# Patient Record
Sex: FEMALE | URBAN - METROPOLITAN AREA
[De-identification: names, ages, dates, MRNs, and addresses within clinical notes are randomized per-mention and may not be internally consistent; named-entity substitution may affect disease eponyms.]

---

## 2023-10-05 ENCOUNTER — APPOINTMENT (OUTPATIENT)
Age: 53
Setting detail: DERMATOLOGY
End: 2023-10-09

## 2023-10-05 DIAGNOSIS — L71.8 OTHER ROSACEA: ICD-10-CM

## 2023-10-05 DIAGNOSIS — L65.9 NONSCARRING HAIR LOSS, UNSPECIFIED: ICD-10-CM

## 2023-10-05 PROCEDURE — OTHER TREATMENT REGIMEN: OTHER

## 2023-10-05 PROCEDURE — OTHER FOLLOW UP FOR NEXT VISIT: OTHER

## 2023-10-05 PROCEDURE — OTHER PRESCRIPTION: OTHER

## 2023-10-05 PROCEDURE — 99203 OFFICE O/P NEW LOW 30 MIN: CPT

## 2023-10-05 PROCEDURE — OTHER COUNSELING: OTHER

## 2023-10-05 RX ORDER — IVERMECTIN/METRONIDAZOLE/NIACI 1 %-1 %-4%
THIN COAT GEL (GRAM) TOPICAL
Qty: 30 | Refills: 2 | Status: ERX | COMMUNITY
Start: 2023-10-05

## 2023-10-05 RX ORDER — DOXYCYCLINE HYCLATE 50 MG/1
50 MG CAPSULE, GELATIN COATED ORAL BID
Qty: 30 | Refills: 1 | Status: ERX | COMMUNITY
Start: 2023-10-05

## 2023-10-05 ASSESSMENT — LOCATION SIMPLE DESCRIPTION DERM
LOCATION SIMPLE: LEFT CHEEK
LOCATION SIMPLE: RIGHT CHEEK
LOCATION SIMPLE: LEFT FOREHEAD
LOCATION SIMPLE: NOSE
LOCATION SIMPLE: LEFT SCALP
LOCATION SIMPLE: ANTERIOR SCALP

## 2023-10-05 ASSESSMENT — LOCATION DETAILED DESCRIPTION DERM
LOCATION DETAILED: RIGHT LATERAL MALAR CHEEK
LOCATION DETAILED: MID-FRONTAL SCALP
LOCATION DETAILED: LEFT INFERIOR MEDIAL FOREHEAD
LOCATION DETAILED: LEFT CENTRAL MALAR CHEEK
LOCATION DETAILED: NASAL SUPRATIP
LOCATION DETAILED: LEFT LATERAL FRONTAL SCALP

## 2023-10-05 ASSESSMENT — LOCATION ZONE DERM
LOCATION ZONE: SCALP
LOCATION ZONE: NOSE
LOCATION ZONE: FACE

## 2023-10-05 ASSESSMENT — SEVERITY ASSESSMENT OVERALL AMONG ALL PATIENTS
IN YOUR EXPERIENCE, AMONG ALL PATIENTS YOU HAVE SEEN WITH THIS CONDITION, HOW SEVERE IS THIS PATIENT'S CONDITION?: MODERATE TO SEVERE

## 2023-10-05 NOTE — HPI: ACNE (PATIENT REPORTED)
Where Is Your Acne Located?: Face
Additional Comments (Use Complete Sentences): Patient presents to discuss her acne.  She also believes she has rosacea and would like to discuss treatment options for both conditions which started at the same time. Acne 8 months 16 years ago uterus taken out. She is currently not doing any treatment.

## 2023-10-05 NOTE — PROCEDURE: TREATMENT REGIMEN
Detail Level: Zone
Plan: Patient reassured this is consistent with Rosacea. She will use the topical Rosacea gel from the office once or twice daily. She will take the oral doxycycline 100 mg once daily. Follow up in 6 to 8 weeks to check progress to determine if she needs to continue any form of oral medication or if she can control this with just the topical regimen.
Plan: Patient did have previous bloodwork. She is going to get us a copy of that bloodwork to determine if any further laboratory studies might be needed regarding her alopecia. Once we have that if everything is normal, we will consider other treatment options.
Initiate Treatment: [facility] rosacea gel, doxycycline

## 2023-10-05 NOTE — PROCEDURE: FOLLOW UP FOR NEXT VISIT
Scheduled For Follow Up In (Optional): 2 months
Detail Level: Detailed
Scheduled For Follow Up In (Optional): prn

## 2023-10-05 NOTE — HPI: ANDROGENIC ALOPECIA
Is This A New Presentation, Or A Follow-Up?: Hair Loss
How Did The Hair Loss Occur?: gradual in onset
How Severe Is Your Hair Loss?: moderate
Previous Labs: Yes

## 2023-10-11 ENCOUNTER — APPOINTMENT (OUTPATIENT)
Age: 53
Setting detail: DERMATOLOGY
End: 2023-10-11

## 2023-10-11 DIAGNOSIS — L65.9 NONSCARRING HAIR LOSS, UNSPECIFIED: ICD-10-CM

## 2023-10-11 PROCEDURE — OTHER ORDER TESTS: OTHER

## 2023-10-11 PROCEDURE — OTHER TREATMENT REGIMEN: OTHER

## 2023-10-11 NOTE — PROCEDURE: TREATMENT REGIMEN
Plan: Patient had CBC and CMP checked previously so we will NOT include those on the lab order pay. The remainder of the lab order will be specific for her alopecia. Once we have those results, we will call the patient to review them and consider different treatment options based on those results.
Detail Level: Simple

## 2023-12-05 ENCOUNTER — APPOINTMENT (OUTPATIENT)
Age: 53
Setting detail: DERMATOLOGY
End: 2023-12-07

## 2023-12-05 DIAGNOSIS — L65.9 NONSCARRING HAIR LOSS, UNSPECIFIED: ICD-10-CM

## 2023-12-05 DIAGNOSIS — L71.8 OTHER ROSACEA: ICD-10-CM

## 2023-12-05 DIAGNOSIS — L81.4 OTHER MELANIN HYPERPIGMENTATION: ICD-10-CM

## 2023-12-05 PROCEDURE — OTHER PRESCRIPTION: OTHER

## 2023-12-05 PROCEDURE — OTHER COUNSELING: OTHER

## 2023-12-05 PROCEDURE — 99213 OFFICE O/P EST LOW 20 MIN: CPT

## 2023-12-05 PROCEDURE — OTHER FOLLOW UP FOR NEXT VISIT: OTHER

## 2023-12-05 PROCEDURE — OTHER TREATMENT REGIMEN: OTHER

## 2023-12-05 RX ORDER — IVERMECTIN/METRONIDAZOLE/NIACI 1 %-1 %-4%
THIN COAT GEL (GRAM) TOPICAL QHS
Qty: 30 | Refills: 0 | Status: ERX

## 2023-12-05 RX ORDER — H-QUINONE/TRETINOIN/HYDROCORT 8-0.05-0.5
THIN COAT EMULSION (GRAM) TOPICAL AS DIRECTED
Qty: 30 | Refills: 0 | Status: ERX | COMMUNITY
Start: 2023-12-05

## 2023-12-05 RX ORDER — MINOXIDIL/PROGESTERONE 7 %-0.1 %
THIN COAT SOLUTION, NON-ORAL TOPICAL DAILY
Qty: 60 | Refills: 0 | Status: ERX | COMMUNITY
Start: 2023-12-05

## 2023-12-05 ASSESSMENT — LOCATION DETAILED DESCRIPTION DERM
LOCATION DETAILED: LEFT SUPERIOR UPPER BACK
LOCATION DETAILED: MID-FRONTAL SCALP
LOCATION DETAILED: RIGHT LATERAL MALAR CHEEK
LOCATION DETAILED: NASAL SUPRATIP
LOCATION DETAILED: LEFT INFERIOR MEDIAL FOREHEAD
LOCATION DETAILED: LEFT CENTRAL MALAR CHEEK

## 2023-12-05 ASSESSMENT — LOCATION ZONE DERM
LOCATION ZONE: FACE
LOCATION ZONE: TRUNK
LOCATION ZONE: NOSE
LOCATION ZONE: SCALP

## 2023-12-05 ASSESSMENT — LOCATION SIMPLE DESCRIPTION DERM
LOCATION SIMPLE: RIGHT CHEEK
LOCATION SIMPLE: LEFT UPPER BACK
LOCATION SIMPLE: LEFT FOREHEAD
LOCATION SIMPLE: ANTERIOR SCALP
LOCATION SIMPLE: LEFT CHEEK
LOCATION SIMPLE: NOSE

## 2023-12-05 ASSESSMENT — SEVERITY ASSESSMENT OVERALL AMONG ALL PATIENTS
IN YOUR EXPERIENCE, AMONG ALL PATIENTS YOU HAVE SEEN WITH THIS CONDITION, HOW SEVERE IS THIS PATIENT'S CONDITION?: MINIMAL
IN YOUR EXPERIENCE, AMONG ALL PATIENTS YOU HAVE SEEN WITH THIS CONDITION, HOW SEVERE IS THIS PATIENT'S CONDITION?: S1 (LESS THAN 25% HAIR LOSS)

## 2023-12-05 ASSESSMENT — BSA RASH: BSA RASH: 1

## 2023-12-05 ASSESSMENT — SEVERITY OF ALOPECIA TOOL: % SCALP HAIR LOST: 10

## 2023-12-05 ASSESSMENT — SEVERITY ASSESSMENT: SEVERITY: MILD TO MODERATE

## 2023-12-05 NOTE — PROCEDURE: TREATMENT REGIMEN
Detail Level: Zone
Plan: Patient has good control. She will continue the topical in-house rosacea gel. She can start to taper off of the doxycycline to see if she still needs it. If so, she can contact me for possibly a lower dose prescription if she needs to continue taking the oral medication. Regarding the small blood vessels around the nose and on the cheeks, we did briefly discussed the possibility of IPL and I will email her information regarding what that might cost.
Plan: This appears to be consistent with likely androgenetic alopecia. However, in reviewing her bloodwork, she does have a slightly low vitamin D, which she is taking supplementation for. She also has a slightly low DHEAS, which could imply adrenal insufficiency, but she has no other symptoms. I will need to do a little reading and research to determine if anything further needs to be checked regarding that lab abnormality. She may need to see her primary care physician For further work. In regards to that we could start the patient on spironolactone which may help the hair loss. She’s also going to start the in-house topical minoxidil product to help stimulate hair growth. If still no improvement, we may consider scalp biopsy.
Plan: There is postoperative hyperpigmentation on her left upper back. Some of that shows some brown discoloration but some of that is also vascular. She will try the topical melasma emulsion to see if that helps fade some of the color, but she can also consider IPL and I will email her a quote about possible treatment and cost.
Detail Level: Detailed
Continue Regimen: [facility] rosacea gel, doxycycline

## 2023-12-06 RX ORDER — SPIRONOLACTONE 50 MG/1
50 MG TABLET, FILM COATED ORAL BID
Qty: 60 | Refills: 6 | Status: ERX | COMMUNITY
Start: 2023-12-06

## 2023-12-06 RX ORDER — DOXYCYCLINE HYCLATE 50 MG/1
50 MG CAPSULE, GELATIN COATED ORAL DAILY
Qty: 30 | Refills: 1 | Status: ERX

## 2024-03-05 ENCOUNTER — RX ONLY (RX ONLY)
Age: 54
End: 2024-03-05

## 2024-03-05 ENCOUNTER — APPOINTMENT (OUTPATIENT)
Age: 54
Setting detail: DERMATOLOGY
End: 2024-03-05

## 2024-03-05 DIAGNOSIS — L71.8 OTHER ROSACEA: ICD-10-CM

## 2024-03-05 PROCEDURE — OTHER TREATMENT REGIMEN: OTHER

## 2024-03-05 PROCEDURE — OTHER PRESCRIPTION: OTHER

## 2024-03-05 PROCEDURE — 99213 OFFICE O/P EST LOW 20 MIN: CPT

## 2024-03-05 PROCEDURE — OTHER FOLLOW UP FOR NEXT VISIT: OTHER

## 2024-03-05 PROCEDURE — OTHER COUNSELING: OTHER

## 2024-03-05 RX ORDER — H-QUINONE/TRETINOIN/HYDROCORT 8-0.05-0.5
EMULSION (GRAM) TOPICAL
Qty: 30 | Refills: 0 | Status: CANCELLED | COMMUNITY
Start: 2024-03-05

## 2024-03-05 RX ORDER — IVERMECTIN/METRONIDAZOLE/NIACI 1 %-1 %-4%
GEL (GRAM) TOPICAL
Qty: 30 | Refills: 0 | Status: ERX

## 2024-03-05 RX ORDER — IVERMECTIN/METRONIDAZOLE/NIACI 1 %-1 %-4%
GEL (GRAM) TOPICAL QHS
Qty: 60 | Refills: 0 | Status: CANCELLED

## 2024-03-05 RX ORDER — DOXYCYCLINE HYCLATE 50 MG/1
CAPSULE, GELATIN COATED ORAL DAILY
Qty: 30 | Refills: 6 | Status: ERX

## 2024-03-05 ASSESSMENT — LOCATION DETAILED DESCRIPTION DERM
LOCATION DETAILED: RIGHT LATERAL MALAR CHEEK
LOCATION DETAILED: LEFT CENTRAL MALAR CHEEK
LOCATION DETAILED: LEFT INFERIOR MEDIAL FOREHEAD
LOCATION DETAILED: NASAL SUPRATIP

## 2024-03-05 ASSESSMENT — LOCATION SIMPLE DESCRIPTION DERM
LOCATION SIMPLE: NOSE
LOCATION SIMPLE: LEFT FOREHEAD
LOCATION SIMPLE: RIGHT CHEEK
LOCATION SIMPLE: LEFT CHEEK

## 2024-03-05 ASSESSMENT — LOCATION ZONE DERM
LOCATION ZONE: FACE
LOCATION ZONE: NOSE

## 2024-03-05 NOTE — PROCEDURE: TREATMENT REGIMEN
Continue Regimen: [facility] rosacea gel, doxycycline
Detail Level: Zone
Plan: Patient is seeing good results using the topical and oral combination. She will continue the topical in-house rosacea gel as well as the low dose doxycycline. We will send in a refill of the doxycycline.

## 2024-08-29 ENCOUNTER — APPOINTMENT (OUTPATIENT)
Age: 54
Setting detail: DERMATOLOGY
End: 2024-08-29

## 2024-08-29 DIAGNOSIS — L71.8 OTHER ROSACEA: ICD-10-CM

## 2024-08-29 PROCEDURE — OTHER PRESCRIPTION: OTHER

## 2024-08-29 PROCEDURE — OTHER COUNSELING: OTHER

## 2024-08-29 PROCEDURE — 99213 OFFICE O/P EST LOW 20 MIN: CPT

## 2024-08-29 PROCEDURE — OTHER TREATMENT REGIMEN: OTHER

## 2024-08-29 RX ORDER — IVERMECTIN/METRONIDAZOLE/NIACI 1 %-1 %-4%
THIN COAT GEL (GRAM) TOPICAL AS DIRECTED
Qty: 30 | Refills: 4 | Status: ERX | COMMUNITY
Start: 2024-08-29

## 2024-08-29 ASSESSMENT — LOCATION SIMPLE DESCRIPTION DERM
LOCATION SIMPLE: NOSE
LOCATION SIMPLE: RIGHT CHEEK
LOCATION SIMPLE: LEFT CHEEK

## 2024-08-29 ASSESSMENT — LOCATION DETAILED DESCRIPTION DERM
LOCATION DETAILED: RIGHT INFERIOR CENTRAL MALAR CHEEK
LOCATION DETAILED: LEFT INFERIOR CENTRAL MALAR CHEEK
LOCATION DETAILED: NASAL SUPRATIP

## 2024-08-29 ASSESSMENT — LOCATION ZONE DERM
LOCATION ZONE: NOSE
LOCATION ZONE: FACE

## 2024-08-29 ASSESSMENT — SEVERITY ASSESSMENT OVERALL AMONG ALL PATIENTS
IN YOUR EXPERIENCE, AMONG ALL PATIENTS YOU HAVE SEEN WITH THIS CONDITION, HOW SEVERE IS THIS PATIENT'S CONDITION?: MINIMAL

## 2024-08-29 NOTE — HPI: FOLLOW-UP
condition_rosacea
Additional History: Patient presents for a rosacea follow up.  Patient is doing well, she is using the topical product from [facility] for rosacea here. She’s no longer taking the oral doxycycline and her rosacea is under good control. She does still have small blood vessels on her nose and cheeks that she’s interested in treatment with IPL.
When Was Your Last Appointment?: 03/05/2024

## 2024-08-29 NOTE — PROCEDURE: TREATMENT REGIMEN
Continue Regimen: [facility] Rosacea gel
Detail Level: Zone
Plan: Patient will continue with the topical rosacea gel from [facility]. We will send in the prescription to the pharmacy so she can call for refills when needed. She can either use it every day or every other day for maintenance. No need for doxycycline at this point. She never did start the melasma emotion and no need for that at this time.  Regarding the IPL, quoted her a price of $200 for the treatment of her face.

## 2024-10-11 ENCOUNTER — APPOINTMENT (OUTPATIENT)
Age: 54
Setting detail: DERMATOLOGY
End: 2024-10-11

## 2024-10-11 DIAGNOSIS — L71.8 OTHER ROSACEA: ICD-10-CM

## 2024-10-11 PROCEDURE — OTHER ALMA LASER: OTHER

## 2024-10-11 PROCEDURE — OTHER TREATMENT REGIMEN: OTHER

## 2024-10-11 PROCEDURE — OTHER COUNSELING: OTHER

## 2024-10-11 ASSESSMENT — LOCATION SIMPLE DESCRIPTION DERM
LOCATION SIMPLE: LEFT CHEEK
LOCATION SIMPLE: NOSE
LOCATION SIMPLE: RIGHT CHEEK

## 2024-10-11 ASSESSMENT — LOCATION DETAILED DESCRIPTION DERM
LOCATION DETAILED: NASAL SUPRATIP
LOCATION DETAILED: LEFT INFERIOR CENTRAL MALAR CHEEK
LOCATION DETAILED: RIGHT CENTRAL MALAR CHEEK
LOCATION DETAILED: RIGHT INFERIOR CENTRAL MALAR CHEEK

## 2024-10-11 ASSESSMENT — LOCATION ZONE DERM
LOCATION ZONE: FACE
LOCATION ZONE: NOSE

## 2024-10-11 NOTE — PROCEDURE: TREATMENT REGIMEN
Continue Regimen: [facility] Rosacea gel
Detail Level: Zone
Plan: Patient will continue with the topical rosacea gel from [facility]. We will send in the prescription to the pharmacy so she can call for refills when needed. She can either use it every day or every other day for maintenance. No need for doxycycline at this point. She never did start the melasma emotion and no need for that at this time.  Regarding the IPL, quoted her a price of $200 for the treatment of her face.
